# Patient Record
Sex: FEMALE | Race: WHITE | NOT HISPANIC OR LATINO | URBAN - METROPOLITAN AREA
[De-identification: names, ages, dates, MRNs, and addresses within clinical notes are randomized per-mention and may not be internally consistent; named-entity substitution may affect disease eponyms.]

---

## 2023-01-18 ENCOUNTER — CLINICAL SUPPORT (OUTPATIENT)
Dept: REHABILITATION | Facility: HOSPITAL | Age: 63
End: 2023-01-18
Attending: ORTHOPAEDIC SURGERY
Payer: COMMERCIAL

## 2023-01-18 DIAGNOSIS — S82.891A OTHER FRACTURE OF RIGHT LOWER LEG, INITIAL ENCOUNTER FOR CLOSED FRACTURE: ICD-10-CM

## 2023-01-18 DIAGNOSIS — S82.891D CLOSED FRACTURE OF RIGHT ANKLE WITH ROUTINE HEALING, SUBSEQUENT ENCOUNTER: ICD-10-CM

## 2023-01-18 PROCEDURE — 97110 THERAPEUTIC EXERCISES: CPT | Mod: PN

## 2023-01-18 PROCEDURE — 97161 PT EVAL LOW COMPLEX 20 MIN: CPT | Mod: PN

## 2023-01-19 PROBLEM — S82.891A CLOSED RIGHT ANKLE FRACTURE: Status: ACTIVE | Noted: 2023-01-19

## 2023-01-19 PROBLEM — S82.891A OTHER FRACTURE OF RIGHT LOWER LEG, INITIAL ENCOUNTER FOR CLOSED FRACTURE: Status: ACTIVE | Noted: 2023-01-19

## 2023-01-19 NOTE — PATIENT INSTRUCTIONS
Home exercise program: Written/visual instruction and dosages provided.   Ankle pumps 3x15  Clock-wise/CWW ankle mobility 2x14  4 way ankle with green/blue theraband 2x10 ea   Calf stretch with strap [id number]

## 2023-01-19 NOTE — PLAN OF CARE
[facility]  Physical Therapy Initial Evaluation    Name: Erica Terry  Clinic Number: [id number]    Therapy Diagnosis:   Encounter Diagnoses   Name Primary?    Closed fracture of right ankle with routine healing, subsequent encounter     Other fracture of right lower leg, initial encounter for closed fracture      Physician: Felipe Holman*    Physician Orders: PT Eval and Treat   Medical Diagnosis from Referral:   S82.891A (ICD-10-CM) - Closed fracture of right ankle   S82.891A (ICD-10-CM) - Other fracture of right lower leg, initial encounter for closed fracture     Evaluation Date: 1/18/2023  Authorization Period Expiration: 12/29/2023  Plan of Care Expiration: 3/15/2023  Visit # / Visits authorized: 1/ 1    Time In: 4:15PM  Time Out: 5:00PM  Total Billable Time: 45 minutes    Precautions: Standard, falls    Subjective   Date of onset: 11/2/22  History of current condition - Erica reports: She is 61 y.o. female presents with Ankle Injury/Fracture (right)  . Patient states it occurred on 11/2/22 when she was walking and acutely twisted her ankle while stepping down from a curb. Patient experienced acute onset pain w/ swelling, able to range joint but painful when dorsiflexes. No numbness or tingling. X ray showed lateral malleolus w/ involvement of mortise. Placed in short leg w/ stirrups. Given crutches and pain controlled. Patient indicated that she followed MD protocol in the following weeks with instructions for NWB for a period of time before being prescribed use of a boot that she stated she uses in the community but not at home with WBAT status.        No past medical history on file.  Erica Terry  has no past surgical history on file.    Erica currently has no medications in their medication list.    Review of patient's allergies indicates:  Not on File     Imaging, : Imaging: the following images were reviewed and interpreted by the radiologist     XR Ankle 3+ VW  Right   Final Result     Lateral malleolar fracture and abnormal appearance of the ankle mortise with widening of the medial ankle joint concerning for ligamentous injury. Ankle joint effusion.     Questionable faint lucency of the posterior distal tibia adjacent to the ankle which may reflect a posterior malleolar injury/fracture.      Prior Therapy: n/a  Social History:  Patient lives alone, requiring to negotiate 1 flight of stairs and 8 consecutive steps to enter home with unilateral (right ascending) rail.   Occupation: [employer] , working from home but requested to return to office   Prior Level of Function: independent with (I)ADLs  Current Level of Function: independent with (I) ADLs but needing increased time to perform tasks and unable to drive per MD recommendations until PHYSICAL THERAPIST evaluation (per patient).     Pain:  Current 0/10, worst 2/10, best 0/10   Location: right ankles  Description: Dull  Aggravating Factors: Walking  Easing Factors: rest    Pts goals: Patient would like to return to driving regularly to increase independence with ADLs and return to work in person.      Objective     Observation: Patient presented with decreased ankle DF, knee flexion and hip flexion during in right lower extremity in end range moments of gait.   Posture: not abnormalities observes     Active Range of Motion / Passive range of motion :   Ankle Right Left   DF (knee extended) 6 deg 12 deg   Plantarflexion 28 deg 40 deg   Inversion 23 deg 29 deg   Eversion 7 deg 16 deg      Strength:  Ankle Right Left   Dorsiflexion 4-/5 4+/5   Plantarflexion 4/5 4+/5   Inversion 4-/5 4+/5   Eversion 4-/5 4+/5     Special Tests:  Anterior Drawer -   Talar tilt -   Squeeze test N/a   Mai N/a       Joint Mobility: Decreased gross talocrural joint mobility in right compared to left     Palpation: No abnormal findings or reported sx     Sensation: No abnormal findings or reported sx     Functional Tests:   SLS  EO: not assessed on evaluation     Double leg squat: Decreased squat depth, likely due to ankle mobility limitations.   Single leg squat: not assessed on evaluation      Edema: right ankle edema noted, unable to assess accurately due to equipment malfunction     CMS Impairment/Limitation/Restriction for FOTO 1/1 Survey    Therapist reviewed FOTO scores for Erica Terry on 1/18/2023.   FOTO documents entered into [employer] - see Media section.    Limitation Score: 24% (disability)  Category: Mobility         TREATMENT   Treatment Time In: 4:15pm  Treatment Time Out: 5:00pm  Total Treatment time separate from Evaluation: 25 minutes    Erica received therapeutic exercises to develop strength, endurance, ROM, flexibility, and posture for 25 minutes including:  Ankle pumps 3x15  Clock-wise/CWW ankle mobility 2x14  4 way ankle with green/blue theraband 2x10 ea   Calf stretch with strap [id number]    Erica received the following manual therapy techniques: Joint mobilizations, Manual traction, Myofacial release, Soft tissue Mobilization, and Friction Massage were applied to the:  for 0 minutes, including:      Erica participated in neuromuscular re-education activities to improve: Balance, Coordination, Proprioception, and Posture for 0 minutes. The following activities were included:      Erica participated in dynamic functional therapeutic activities to improve functional performance for 0  minutes, including:      Erica participated in gait training to improve functional mobility and safety for 0  minutes, including:      Home Exercises and Patient Education Provided    Education provided re: Activity recommendations, goals for therapy, role of therapy for care, exercises/HEP    Written Home Exercises Provided: By PHYSICAL THERAPIST .  Exercises were reviewed and Erica was able to demonstrate them prior to the end of the session.   Pt received a written copy of exercises to perform at home. Erica demonstrated good   understanding of the education provided.     See EMR under patient instructions for exercises given.   Assessment   Erica is a 62 y.o. female referred to outpatient Physical Therapy with a medical diagnosis of closed fracture of right ankle from incident on 11/2/22. Pt presents with decreased strength, decreased ROM/mobility, gait abnormalities, decreased self reported endurance and pain.     Pt prognosis is Good.   Pt will benefit from skilled outpatient Physical Therapy to address the deficits stated above and in the chart below, provide pt/family education, and to maximize pt's level of independence.     Plan of care discussed with patient: Yes  Pt's spiritual, cultural and educational needs considered and patient is agreeable to the plan of care and goals as stated below:     Anticipated Barriers for therapy: Proximity to incident     Medical Necessity is demonstrated by the following  History  Co-morbidities and personal factors that may impact the plan of care Co-morbidities:   See patient med Hx for details    Personal Factors:   none     low   Examination  Body Structures and Functions, activity limitations and participation restrictions that may impact the plan of care Body Regions:   lower extremities    Body Systems:    gross symmetry  ROM  strength  gait    Participation Restrictions:   Driving is currently restricted until said otherwise by PT per patient MD orders    Activity limitations:   Learning and applying knowledge  no deficits    General Tasks and Commands  no deficits    Communication  no deficits    Mobility  walking  driving (bike, car, motorcycle)    Self care  no deficits    Domestic Life  no deficits    Interactions/Relationships  no deficits    Life Areas  employment    Community and Social Life  no deficits         low   Clinical Presentation stable and uncomplicated low   Decision Making/ Complexity Score: low     Goals:  Short Term Goals:  4 weeks  1.Report decreased right ankle pain   < / =  0/10  to increase tolerance for ADLs.   2. Increase ROM by atleast 5 degrees in order to walk with min to no compensation.  3. Increase strength by 1/3 MMT great for  right lower extremity  to increase tolerance for ADL and work activities.  4. Pt to tolerate HEP to improve ROM and independence with ADL's    Long Term Goals: 8 weeks  1.Patient goal: Patient will report competence and confidence return to driving to increase independence with ADLs and return to work in person.   2.Increase strength to 4+/5 for  right lower extremity   to increase tolerance for ADL and work activities.  3. Pt will report at FOTO score for mobility of 10% disability or less to demonstrate increase in LE function and mobility in home and community environment.          Plan   Plan of care Certification: 1/18/2023 to 3/15/2023.    Outpatient Physical Therapy 1-2 times weekly for 8 weeks to include the following interventions: Electrical Stimulation , Gait Training, Manual Therapy, Moist Heat/ Ice, Neuromuscular Re-ed, Patient Education, Therapeutic Activities, and Therapeutic Exercise.     Guy Sawyer, PT

## 2023-01-26 ENCOUNTER — CLINICAL SUPPORT (OUTPATIENT)
Dept: REHABILITATION | Facility: HOSPITAL | Age: 63
End: 2023-01-26
Payer: COMMERCIAL

## 2023-01-26 DIAGNOSIS — S82.891D CLOSED FRACTURE OF RIGHT ANKLE WITH ROUTINE HEALING, SUBSEQUENT ENCOUNTER: Primary | ICD-10-CM

## 2023-01-26 DIAGNOSIS — S82.891A OTHER FRACTURE OF RIGHT LOWER LEG, INITIAL ENCOUNTER FOR CLOSED FRACTURE: ICD-10-CM

## 2023-01-26 PROCEDURE — 97110 THERAPEUTIC EXERCISES: CPT | Mod: PN

## 2023-01-26 PROCEDURE — 97140 MANUAL THERAPY 1/> REGIONS: CPT | Mod: PN

## 2023-01-26 NOTE — PROGRESS NOTES
Physical Therapy Daily Treatment Note     Name: Erica Terry  Clinic Number: [id number]    Therapy Diagnosis:   Encounter Diagnoses   Name Primary?    Closed fracture of right ankle with routine healing, subsequent encounter Yes    Other fracture of right lower leg, initial encounter for closed fracture      Physician: Felipe Holman*    Visit Date: 1/26/2023  Evaluation Date: 1/18/2023  Authorization Period Expiration: 12/29/2023  Plan of Care Expiration: 3/15/2023  Visit # / Visits authorized: 1/ 1    Time In: 2:35 PM  Time Out: 3:20 PM  Total Billable Time: 45 minutes    Precautions: Standard, falls    Subjective     Pt reports: Patient reported no pain or symptoms today, stating that she feels gradual improvements with her right ankle mobility and strength since the previous visit. Patient indicated that she continues to use her boot when walking in the community, but ambulates without it at home.   She was compliant with home exercise program.  Response to previous treatment: Positive  Functional change:     Pain: 0/10  Location: right ankles     Objective     Erica received therapeutic exercises to develop strength, endurance, ROM, flexibility, posture, and core stabilization for 30 minutes including:  Clock-wise/CWW ankle mobility 2x20  Ankle alphabet x1  Seated heel/toe raises 1x10  Standing calf stretch [id number]  Standing calf raises 2x10   3 way hip (flexion,abduction/extension), bilateral with counter, bilateral  1x10 ea  Single leg balance, bilateral  with counter upper extremity support [id number]       Erica received the following manual therapy techniques: Joint mobilizations, Myofacial release, Soft tissue Mobilization, and Friction Massage were applied to the: right ankle for 15 minutes, including:  Right ankle passive range of motion in all directions, talocrural joint mobilizations (grade 3-4) in all directions    Home Exercises Provided and Patient Education Provided     Education  provided:   -Home exercise program additions   -Management of swelling techniques  -Activity recommendations    Written Home Exercises Provided: yes.  Exercises were reviewed and Erica was able to demonstrate them prior to the end of the session.  Erica demonstrated good  understanding of the education provided.     See EMR under Patient Instructions for exercises provided 1/26/2023.    Assessment     Patient tolerated treatment interventions well today, responding positively to progressions to exercises with good reception of educaiton and recommendations for activity. Patient displays appropriate strength, mobility, coordination and endurance to gradually re-introduce driving- with Physical Therapist providing patient with instructions for gradual approach with passenger. Patient displays good improvements towards addressing deficits and working towards goals.     Erica Is progressing well towards her goals.   Pt prognosis is Excellent.     Pt will continue to benefit from skilled outpatient physical therapy to address the deficits listed in the problem list box on initial evaluation, provide pt/family education and to maximize pt's level of independence in the home and community environment.     Pt's spiritual, cultural and educational needs considered and pt agreeable to plan of care and goals.    Anticipated barriers to physical therapy: chronic    Goals: Short Term Goals:  4 weeks  1.Report decreased right ankle pain  < / =  0/10  to increase tolerance for ADLs.   2. Increase ROM by atleast 5 degrees in order to walk with min to no compensation.  3. Increase strength by 1/3 MMT great for  right lower extremity  to increase tolerance for ADL and work activities.  4. Pt to tolerate HEP to improve ROM and independence with ADL's     Long Term Goals: 8 weeks  1.Patient goal: Patient will report competence and confidence return to driving to increase independence with ADLs and return to work in person.   2.Increase  strength to 4+/5 for  right lower extremity   to increase tolerance for ADL and work activities.  3. Pt will report at FOTO score for mobility of 10% disability or less to demonstrate increase in LE function and mobility in home and community environment.      Plan     Continue POC as indicated     Guy Sawyer, PT   [facility]  Physical Therapy Initial Evaluation     Name: Erica Terry  Clinic Number: [id number]     Therapy Diagnosis:        Encounter Diagnoses   Name Primary?    Closed fracture of right ankle with routine healing, subsequent encounter      Other fracture of right lower leg, initial encounter for closed fracture        Physician: Felipe Holmanel*     Physician Orders: PT Eval and Treat   Medical Diagnosis from Referral:   S82.891A (ICD-10-CM) - Closed fracture of right ankle   S82.891A (ICD-10-CM) - Other fracture of right lower leg, initial encounter for closed fracture         Time In: 4:15PM  Time Out: 5:00PM  Total Billable Time: 45 minutes     Precautions: Standard, falls     Subjective   Date of onset: 11/2/22  History of current condition - Erica reports: She is 61 y.o. female presents with Ankle Injury/Fracture (right)  . Patient states it occurred on 11/2/22 when she was walking and acutely twisted her ankle while stepping down from a curb. Patient experienced acute onset pain w/ swelling, able to range joint but painful when dorsiflexes. No numbness or tingling. X ray showed lateral malleolus w/ involvement of mortise. Placed in short leg w/ stirrups. Given crutches and pain controlled. Patient indicated that she followed MD protocol in the following weeks with instructions for NWB for a period of time before being prescribed use of a boot that she stated she uses in the community but not at home with WBAT status.         No past medical history on file.  Erica Terry  has no past surgical history on file.     Erica currently has no  medications in their medication list.     Review of patient's allergies indicates:  Not on File      Imaging, : Imaging: the following images were reviewed and interpreted by the radiologist     XR Ankle 3+ VW Right   Final Result     Lateral malleolar fracture and abnormal appearance of the ankle mortise with widening of the medial ankle joint concerning for ligamentous injury. Ankle joint effusion.     Questionable faint lucency of the posterior distal tibia adjacent to the ankle which may reflect a posterior malleolar injury/fracture.       Prior Therapy: n/a  Social History:  Patient lives alone, requiring to negotiate 1 flight of stairs and 8 consecutive steps to enter home with unilateral (right ascending) rail.   Occupation: [employer] , working from home but requested to return to office   Prior Level of Function: independent with (I)ADLs  Current Level of Function: independent with (I) ADLs but needing increased time to perform tasks and unable to drive per MD recommendations until PHYSICAL THERAPIST evaluation (per patient).      Pain:  Current 0/10, worst 2/10, best 0/10   Location: right ankles  Description: Dull  Aggravating Factors: Walking  Easing Factors: rest     Pts goals: Patient would like to return to driving regularly to increase independence with ADLs and return to work in person.       Objective      Observation: Patient presented with decreased ankle DF, knee flexion and hip flexion during in right lower extremity in end range moments of gait.   Posture: not abnormalities observes      Active Range of Motion / Passive range of motion :   Ankle Right Left   DF (knee extended) 6 deg 12 deg   Plantarflexion 28 deg 40 deg   Inversion 23 deg 29 deg   Eversion 7 deg 16 deg      Strength:  Ankle Right Left   Dorsiflexion 4-/5 4+/5   Plantarflexion 4/5 4+/5   Inversion 4-/5 4+/5   Eversion 4-/5 4+/5      Special Tests:  Anterior Drawer -   Talar tilt -   Squeeze test N/a   Sergei N/a          Joint Mobility: Decreased gross talocrural joint mobility in right compared to left      Palpation: No abnormal findings or reported sx      Sensation: No abnormal findings or reported sx      Functional Tests:   SLS EO: not assessed on evaluation      Double leg squat: Decreased squat depth, likely due to ankle mobility limitations.   Single leg squat: not assessed on evaluation       Edema: right ankle edema noted, unable to assess accurately due to equipment malfunction      CMS Impairment/Limitation/Restriction for FOTO 1/1 Survey     Therapist reviewed FOTO scores for Erica Terry on 1/18/2023.   FOTO documents entered into [employer] - see Media section.     Limitation Score: 24% (disability)  Category: Mobility            TREATMENT   Treatment Time In: 4:15pm  Treatment Time Out: 5:00pm  Total Treatment time separate from Evaluation: 25 minutes     Erica received therapeutic exercises to develop strength, endurance, ROM, flexibility, and posture for 25 minutes including:  Ankle pumps 3x15  Clock-wise/CWW ankle mobility 2x14  4 way ankle with green/blue theraband 2x10 ea   Calf stretch with strap [id number]     Erica received the following manual therapy techniques: Joint mobilizations, Manual traction, Myofacial release, Soft tissue Mobilization, and Friction Massage were applied to the:  for 0 minutes, including:        Erica participated in neuromuscular re-education activities to improve: Balance, Coordination, Proprioception, and Posture for 0 minutes. The following activities were included:        Erica participated in dynamic functional therapeutic activities to improve functional performance for 0  minutes, including:        Erica participated in gait training to improve functional mobility and safety for 0  minutes, including:        Home Exercises and Patient Education Provided     Education provided re: Activity recommendations, goals for therapy, role of therapy for care, exercises/HEP     Written  Home Exercises Provided: By PHYSICAL THERAPIST .  Exercises were reviewed and Erica was able to demonstrate them prior to the end of the session.   Pt received a written copy of exercises to perform at home. Erica demonstrated good  understanding of the education provided.      See EMR under patient instructions for exercises given.   Assessment   Erica is a 62 y.o. female referred to outpatient Physical Therapy with a medical diagnosis of closed fracture of right ankle from incident on 11/2/22. Pt presents with decreased strength, decreased ROM/mobility, gait abnormalities, decreased self reported endurance and pain.      Pt prognosis is Good.   Pt will benefit from skilled outpatient Physical Therapy to address the deficits stated above and in the chart below, provide pt/family education, and to maximize pt's level of independence.      Plan of care discussed with patient: Yes  Pt's spiritual, cultural and educational needs considered and patient is agreeable to the plan of care and goals as stated below:      Anticipated Barriers for therapy: Proximity to incident      Medical Necessity is demonstrated by the following  History  Co-morbidities and personal factors that may impact the plan of care Co-morbidities:   See patient med Hx for details     Personal Factors:   none       low   Examination  Body Structures and Functions, activity limitations and participation restrictions that may impact the plan of care Body Regions:   lower extremities     Body Systems:    gross symmetry  ROM  strength  gait     Participation Restrictions:   Driving is currently restricted until said otherwise by PT per patient MD orders     Activity limitations:   Learning and applying knowledge  no deficits     General Tasks and Commands  no deficits     Communication  no deficits     Mobility  walking  driving (bike, car, motorcycle)     Self care  no deficits     Domestic Life  no deficits     Interactions/Relationships  no  deficits     Life Areas  employment     Community and Social Life  no deficits             low   Clinical Presentation stable and uncomplicated low   Decision Making/ Complexity Score: low      Goals:           Plan   Plan of care Certification: 1/18/2023 to 3/15/2023.     Outpatient Physical Therapy 1-2 times weekly for 8 weeks to include the following interventions: Electrical Stimulation , Gait Training, Manual Therapy, Moist Heat/ Ice, Neuromuscular Re-ed, Patient Education, Therapeutic Activities, and Therapeutic Exercise.      Guy Sawyer, PT

## 2023-02-08 ENCOUNTER — PATIENT MESSAGE (OUTPATIENT)
Dept: REHABILITATION | Facility: HOSPITAL | Age: 63
End: 2023-02-08
Payer: COMMERCIAL